# Patient Record
Sex: MALE | Race: WHITE | NOT HISPANIC OR LATINO | Employment: UNEMPLOYED | URBAN - METROPOLITAN AREA
[De-identification: names, ages, dates, MRNs, and addresses within clinical notes are randomized per-mention and may not be internally consistent; named-entity substitution may affect disease eponyms.]

---

## 2021-05-06 ENCOUNTER — OFFICE VISIT (OUTPATIENT)
Dept: NEUROLOGY | Facility: CLINIC | Age: 61
End: 2021-05-06

## 2021-05-06 VITALS
TEMPERATURE: 98.2 F | SYSTOLIC BLOOD PRESSURE: 130 MMHG | OXYGEN SATURATION: 98 % | DIASTOLIC BLOOD PRESSURE: 86 MMHG | WEIGHT: 224 LBS | BODY MASS INDEX: 31.36 KG/M2 | HEART RATE: 88 BPM | HEIGHT: 71 IN

## 2021-05-06 DIAGNOSIS — G43.C0 PERIODIC HEADACHE SYNDROME, NOT INTRACTABLE: Primary | ICD-10-CM

## 2021-05-06 PROCEDURE — 99244 OFF/OP CNSLTJ NEW/EST MOD 40: CPT | Performed by: PSYCHIATRY & NEUROLOGY

## 2021-05-06 RX ORDER — PANTOPRAZOLE SODIUM 40 MG/1
40 TABLET, DELAYED RELEASE ORAL 2 TIMES DAILY
COMMUNITY

## 2021-05-06 RX ORDER — FAMOTIDINE 20 MG/1
20 TABLET, FILM COATED ORAL DAILY PRN
COMMUNITY

## 2021-05-06 RX ORDER — OXYCODONE HYDROCHLORIDE AND ACETAMINOPHEN 5; 325 MG/1; MG/1
1 TABLET ORAL EVERY 6 HOURS PRN
COMMUNITY
End: 2021-05-06

## 2021-05-06 RX ORDER — FOLIC ACID 1 MG/1
1 TABLET ORAL DAILY
COMMUNITY

## 2021-05-06 RX ORDER — MAGNESIUM CHLORIDE 64 MG
TABLET, DELAYED RELEASE (ENTERIC COATED) ORAL DAILY
COMMUNITY

## 2021-05-06 RX ORDER — ELETRIPTAN HYDROBROMIDE 40 MG/1
40 TABLET, FILM COATED ORAL ONCE AS NEEDED
COMMUNITY

## 2021-05-06 RX ORDER — ROSUVASTATIN CALCIUM 40 MG/1
40 TABLET, COATED ORAL DAILY
COMMUNITY

## 2021-05-06 RX ORDER — TOPIRAMATE 25 MG/1
TABLET ORAL
Qty: 120 TABLET | Refills: 3 | Status: SHIPPED | OUTPATIENT
Start: 2021-05-06 | End: 2021-10-12 | Stop reason: DRUGHIGH

## 2021-05-06 RX ORDER — BUPROPION HYDROCHLORIDE 150 MG/1
150 TABLET, EXTENDED RELEASE ORAL 2 TIMES DAILY
COMMUNITY

## 2021-05-06 RX ORDER — MELATONIN
1000 DAILY
COMMUNITY

## 2021-05-06 RX ORDER — SUMATRIPTAN 100 MG/1
100 TABLET, FILM COATED ORAL
COMMUNITY
End: 2021-05-06 | Stop reason: ALTCHOICE
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
## 2021-08-24 ENCOUNTER — OFFICE VISIT (OUTPATIENT)
Dept: NEUROLOGY | Facility: CLINIC | Age: 61
End: 2021-08-24

## 2021-08-24 VITALS
BODY MASS INDEX: 30.32 KG/M2 | WEIGHT: 216.6 LBS | DIASTOLIC BLOOD PRESSURE: 82 MMHG | HEIGHT: 71 IN | HEART RATE: 70 BPM | OXYGEN SATURATION: 96 % | SYSTOLIC BLOOD PRESSURE: 118 MMHG

## 2021-08-24 DIAGNOSIS — R25.1 TREMOR: ICD-10-CM

## 2021-08-24 DIAGNOSIS — G43.C0 PERIODIC HEADACHE SYNDROME, NOT INTRACTABLE: Primary | ICD-10-CM

## 2021-08-24 DIAGNOSIS — R20.0 NUMBNESS AND TINGLING OF BOTH UPPER EXTREMITIES: ICD-10-CM

## 2021-08-24 DIAGNOSIS — R20.2 NUMBNESS AND TINGLING OF BOTH UPPER EXTREMITIES: ICD-10-CM

## 2021-08-24 PROCEDURE — 99213 OFFICE O/P EST LOW 20 MIN: CPT | Performed by: NURSE PRACTITIONER

## 2021-08-24 NOTE — PROGRESS NOTES
Headache New Office Visit      Encounter Date: 2021   Patient Name: Eliseo Hernandez  : 1960   MRN: [id number]     Chief Complaint:    Chief Complaint   Patient presents with   • Headache       History of Present Illness: Eliseo Hernandez is a 61 y.o. male who is here today for evaluation of headaches.      Last visit 21 w Dr Trent-start TPM, cont Relpax prn.    Taking Topamax with less frequency of severe HA. No new side effects. Has chronic N/T in fingertips bilat hands    Headache Symptoms:   Days per month:Less severe HA days, now only 3 severe HA/week. Still with mild HA daily.  Location: Forehead radiates to bilat sides and occipital area.  Quality: Pressure        Duration:1- 4-5 hours  Severity: 8-9/10   Triggers: None  Associated Symptoms: Photophobia, Phonophobia, Dizziness and  Vision changes none No whooshing sound  Aura: Prism rainbow can be in both eyes but not at the same time      Abortives: Relpax -occasionally effective. Sumatriptan  Preventives: BTX, Propranolol, TCAD, TPM          Numbness                                                                   Having numbness and tingling in arms and legs. Dropping fork and coffee cup. Has been present for years but worse recently. No history of CTS. Has Vit B12 def and taking replacement and folic acid. Numbness is not assoc w HA. Bilat UE tremor for years.        Subjective      Past Medical History:   Past Medical History:   Diagnosis Date   • Watt's esophagus    • Chronic back pain    • Depression    • GERD (gastroesophageal reflux disease)    • Hyperlipidemia    • Migraine headache    • Prediabetes    • Subclinical hypothyroidism    • Vitamin B12 deficiency    • Vitamin D deficiency        Past Surgical History:   Past Surgical History:   Procedure Laterality Date   • NO PAST SURGERIES         Family History:   Family History   Problem Relation Age of Onset   • Loi Parkinson White syndrome Son        Social History:    Social History     Socioeconomic History   • Marital status:      Spouse name: Not on file   • Number of children: Not on file   • Years of education: Not on file   • Highest education level: Not on file   Tobacco Use   • Smoking status: Former Smoker     Quit date: 2019     Years since quittin.0   • Smokeless tobacco: Never Used   Vaping Use   • Vaping Use: Never used   Substance and Sexual Activity   • Alcohol use: Not Currently   • Drug use: Never   • Sexual activity: Defer       Medications:     Current Outpatient Medications:   •  cholecalciferol (VITAMIN D3) 25 MCG (1000 UT) tablet, Take 1,000 Units by mouth Daily., Disp: , Rfl:   •  cyanocobalamin (VITAMIN B-12) 250 MCG tablet, Take 250 mcg by mouth Daily., Disp: , Rfl:   •  eletriptan (RELPAX) 40 MG tablet, Take 40 mg by mouth 1 (One) Time As Needed for Migraine. may repeat in 2 hours if necessary, Disp: , Rfl:   •  famotidine (PEPCID) 20 MG tablet, Take 20 mg by mouth 2 (Two) Times a Day., Disp: , Rfl:   •  folic acid (FOLVITE) 1 MG tablet, Take 1 mg by mouth Daily., Disp: , Rfl:   •  levothyroxine (SYNTHROID, LEVOTHROID) 50 MCG tablet, Take 50 mcg by mouth Daily., Disp: , Rfl:   •  magnesium chloride ER 64 MG DR tablet, Take  by mouth Daily., Disp: , Rfl:   •  pantoprazole (PROTONIX) 40 MG EC tablet, Take 40 mg by mouth Daily., Disp: , Rfl:   •  pyridoxine (VITMAIN B-6) 500 MG tablet, Take 500 mg by mouth Daily., Disp: , Rfl:   •  rosuvastatin (CRESTOR) 40 MG tablet, Take 40 mg by mouth Daily., Disp: , Rfl:   •  topiramate (Topamax) 25 MG tablet, Take one tablet twice a day for one week, then two tablets twice a day, Disp: 120 tablet, Rfl: 3  •  buPROPion SR (WELLBUTRIN SR) 150 MG 12 hr tablet, Take 150 mg by mouth 2 (Two) Times a Day., Disp: , Rfl:     Allergies:   No Known Allergies    PHQ-9 Total Score:     STEADI Fall Risk Assessment has not been completed.    Objective     Physical Exam:   Physical Exam  Eyes:      Pupils: Pupils are  "equal, round, and reactive to light.   Neurological:      Mental Status: He is oriented to person, place, and time.      Coordination: Finger-Nose-Finger Test, Heel to Shin Test and Romberg Test normal.      Gait: Gait is intact.   Psychiatric:         Speech: Speech normal.         Neurologic Exam     Mental Status   Oriented to person, place, and time.   Follows 3 step commands.   Attention: normal. Concentration: normal.   Speech: speech is normal   Level of consciousness: alert  Knowledge: consistent with education.   Normal comprehension.     Cranial Nerves     CN III, IV, VI   Pupils are equal, round, and reactive to light.  Right pupil: Accommodation: intact.   Left pupil: Accommodation: intact.   CN III: no CN III palsy  CN VI: no CN VI palsy  Nystagmus: none   Diplopia: none  Upgaze: normal  Downgaze: normal  Conjugate gaze: present    CN VII   Facial expression full, symmetric.     CN VIII   Hearing: intact    Motor Exam   Muscle bulk: normal  Overall muscle tone: normal    Strength   Right biceps: 5/5  Left biceps: 5/5  Right triceps: 5/5  Left triceps: 5/5  Right interossei: 5/5  Left interossei: 5/5  Left anterior tibial: 5/5  Left posterior tibial: 5/5    Sensory Exam   Light touch normal.     Gait, Coordination, and Reflexes     Gait  Gait: normal    Coordination   Romberg: negative  Finger to nose coordination: normal  Heel to shin coordination: normal    Tremor   Resting tremor: absent  Action tremor: left arm and right arm       Vital Signs:   Vitals:    08/24/21 1105   BP: 118/82   Pulse: 70   SpO2: 96%   Weight: 98.2 kg (216 lb 9.6 oz)   Height: 180.3 cm (70.98\")     Body mass index is 30.22 kg/m².         Assessment / Plan      Assessment/Plan:   Diagnoses and all orders for this visit:    1. Periodic headache syndrome, not intractable (Primary)  Comments:  Cont TPM and Relpax. Gave info on CGRPs. He [facility].  Orders:  -     MRI Brain With & Without Contrast; " Future    2. Tremor  -     MRI Brain With & Without Contrast; Future    3. Numbness and tingling of both upper extremities  -     MRI Cervical Spine With & Without Contrast; Future         Patient Education:     Reviewed medications, potential side effects and signs and symptoms to report. Discussed risk versus benefits of treatment plan with patient and/or family-including medications, labs and radiology that may be ordered. Addressed questions and concerns during visit. Patient and/or family verbalized understanding and agree with plan. Instructed to call the office with any questions and report to ER with any life-threatening symptoms.     Follow Up:   FU after MRI or sooner if he wants to start CGRP    During this visit the following were done:  Labs Reviewed []    Labs Ordered []    Radiology Reports Reviewed []    Radiology Ordered []    PCP Records Reviewed []    Referring Provider Records Reviewed []    ER Records Reviewed []    Hospital Records Reviewed []    History Obtained From Family []    Radiology Images Reviewed []    Other Reviewed [x]    Records Requested []      Patito Bryant, GRIFFIN, APRN

## 2021-09-20 ENCOUNTER — APPOINTMENT (OUTPATIENT)
Dept: MRI IMAGING | Facility: HOSPITAL | Age: 61
End: 2021-09-20

## 2021-09-28 ENCOUNTER — HOSPITAL ENCOUNTER (OUTPATIENT)
Dept: MRI IMAGING | Facility: HOSPITAL | Age: 61
Discharge: HOME | End: 2021-09-28

## 2021-09-28 DIAGNOSIS — R25.1 TREMOR: ICD-10-CM

## 2021-09-28 DIAGNOSIS — G43.C0 PERIODIC HEADACHE SYNDROME, NOT INTRACTABLE: ICD-10-CM

## 2021-09-28 DIAGNOSIS — R20.2 NUMBNESS AND TINGLING OF BOTH UPPER EXTREMITIES: ICD-10-CM

## 2021-09-28 DIAGNOSIS — R20.0 NUMBNESS AND TINGLING OF BOTH UPPER EXTREMITIES: ICD-10-CM

## 2021-09-28 PROCEDURE — A9577 INJ MULTIHANCE: HCPCS | Performed by: NURSE PRACTITIONER

## 2021-09-28 PROCEDURE — 72156 MRI NECK SPINE W/O & W/DYE: CPT

## 2021-09-28 PROCEDURE — 0 GADOBENATE DIMEGLUMINE 529 MG/ML SOLUTION: Performed by: NURSE PRACTITIONER

## 2021-09-28 PROCEDURE — 70553 MRI BRAIN STEM W/O & W/DYE: CPT

## 2021-09-28 RX ADMIN — GADOBENATE DIMEGLUMINE 20 ML: 529 INJECTION, SOLUTION INTRAVENOUS at 09:50

## 2021-09-30 ENCOUNTER — TELEPHONE (OUTPATIENT)
Dept: NEUROLOGY | Facility: CLINIC | Age: 61
End: 2021-09-30

## 2021-09-30 DIAGNOSIS — R93.7 ABNORMAL MRI, CERVICAL SPINE: ICD-10-CM

## 2021-09-30 DIAGNOSIS — R20.2 NUMBNESS AND TINGLING OF BOTH UPPER EXTREMITIES: Primary | ICD-10-CM

## 2021-09-30 DIAGNOSIS — R25.1 TREMOR: ICD-10-CM

## 2021-09-30 DIAGNOSIS — R20.0 NUMBNESS AND TINGLING OF BOTH UPPER EXTREMITIES: Primary | ICD-10-CM

## 2021-09-30 NOTE — TELEPHONE ENCOUNTER
Discussed MRI brain and c-spine with patient. [facility] [place] [facility]   MRI now with severe stenosis C4. Will refer to neurosurg for eval of UE paresthesia, weakness and abnormal MRI.

## 2021-10-06 ENCOUNTER — TELEPHONE (OUTPATIENT)
Dept: FAMILY MEDICINE CLINIC | Facility: CLINIC | Age: 61
End: 2021-10-06

## 2021-10-06 NOTE — TELEPHONE ENCOUNTER
Caller: Eliseo Hernandez    Relationship: Self    Best call back number: [phone]    What medications are you currently taking:   Current Outpatient Medications on File Prior to Visit   Medication Sig Dispense Refill   • buPROPion SR (WELLBUTRIN SR) 150 MG 12 hr tablet Take 150 mg by mouth 2 (Two) Times a Day.     • cholecalciferol (VITAMIN D3) 25 MCG (1000 UT) tablet Take 1,000 Units by mouth Daily.     • cyanocobalamin (VITAMIN B-12) 250 MCG tablet Take 250 mcg by mouth Daily.     • eletriptan (RELPAX) 40 MG tablet Take 40 mg by mouth 1 (One) Time As Needed for Migraine. may repeat in 2 hours if necessary     • famotidine (PEPCID) 20 MG tablet Take 20 mg by mouth 2 (Two) Times a Day.     • folic acid (FOLVITE) 1 MG tablet Take 1 mg by mouth Daily.     • levothyroxine (SYNTHROID, LEVOTHROID) 50 MCG tablet Take 50 mcg by mouth Daily.     • magnesium chloride ER 64 MG DR tablet Take  by mouth Daily.     • pantoprazole (PROTONIX) 40 MG EC tablet Take 40 mg by mouth Daily.     • pyridoxine (VITMAIN B-6) 500 MG tablet Take 500 mg by mouth Daily.     • rosuvastatin (CRESTOR) 40 MG tablet Take 40 mg by mouth Daily.     • topiramate (Topamax) 25 MG tablet Take one tablet twice a day for one week, then two tablets twice a day 120 tablet 3     No current facility-administered medications on file prior to visit.      PT HAS 10-12-21 APPT AND WAS TOLD TO LOOK OVER SOME RX OPTIONS AND LET ADRIAN HERNANDEZ KNOW WHICH OPTION PT WANTED TO TRY.    PT HAS DECIDED TO GO WITH [facility].

## 2021-10-12 ENCOUNTER — OFFICE VISIT (OUTPATIENT)
Dept: NEUROLOGY | Facility: CLINIC | Age: 61
End: 2021-10-12

## 2021-10-12 VITALS
DIASTOLIC BLOOD PRESSURE: 86 MMHG | WEIGHT: 223 LBS | HEIGHT: 71 IN | TEMPERATURE: 98 F | BODY MASS INDEX: 31.22 KG/M2 | SYSTOLIC BLOOD PRESSURE: 124 MMHG | HEART RATE: 93 BPM | OXYGEN SATURATION: 97 %

## 2021-10-12 DIAGNOSIS — M48.02 CERVICAL SPINAL STENOSIS: ICD-10-CM

## 2021-10-12 DIAGNOSIS — G43.C0 PERIODIC HEADACHE SYNDROME, NOT INTRACTABLE: Primary | ICD-10-CM

## 2021-10-12 DIAGNOSIS — G43.C0 PERIODIC HEADACHE SYNDROME, NOT INTRACTABLE: ICD-10-CM

## 2021-10-12 DIAGNOSIS — R20.2 NUMBNESS AND TINGLING OF BOTH UPPER EXTREMITIES: ICD-10-CM

## 2021-10-12 DIAGNOSIS — R20.0 NUMBNESS AND TINGLING OF BOTH UPPER EXTREMITIES: ICD-10-CM

## 2021-10-12 PROCEDURE — 99214 OFFICE O/P EST MOD 30 MIN: CPT | Performed by: NURSE PRACTITIONER

## 2021-10-12 RX ORDER — TOPIRAMATE 50 MG/1
TABLET, FILM COATED ORAL
Qty: 120 TABLET | Refills: 2 | Status: SHIPPED | OUTPATIENT
Start: 2021-10-12 | End: 2021-11-29

## 2021-10-12 RX ORDER — TOPIRAMATE 50 MG/1
TABLET, FILM COATED ORAL
Qty: 120 TABLET | Refills: 2 | Status: SHIPPED | OUTPATIENT
Start: 2021-10-12 | End: 2021-10-12 | Stop reason: SDUPTHER

## 2021-10-12 NOTE — TELEPHONE ENCOUNTER
E-Prescribing Status: Transmission to pharmacy failed (10/12/2021 11:43 AM EDT)    Jerald Temple,  We received a transmission failed message which usually means the script just needs to be sent in again, but before I do did you mean for the instructions to be take 1 tab BID for 1 week, then 1 tab BID or were you wanting him to titrate?

## 2021-10-12 NOTE — PROGRESS NOTES
Neuro Office Visit      Encounter Date: 10/12/2021   Patient Name: Eliseo Hernandez  : 1960   MRN: [id number]     Chief Complaint:    Chief Complaint   Patient presents with   • Headache       History of Present Illness: Eliseo Hernandez is a 61 y.o. male who is here today with his wife in Neurology for headaches.      Last appt 21 w me-Cont TPM, relpax, discuss CGRP, MRI brain and c-spine.    MRI Brain With & Without Contrast (2021 09:58)  FINDINGS: Brain: No acute infarct noted on diffusion weighted sequences.  Midline structures are unremarkable and the craniocervical junction is  satisfactory in appearance. Gray-white differentiation is maintained and  there is no evidence of intracranial hemorrhage, mass or mass effect.  The ventricles are normal in size and configuration. There is no  abnormal brain parenchymal enhancement. The orbits are unremarkable and  the paranasal sinuses are grossly clear. Intracranial arterial flow  voids are maintained.      SPRINGER  He did not increase the dose of Topamax, thus no change in HA symptoms. Using Relpax with some relief.   He has researched his formulary coverage and would like to try Nurtec. He is willing to go up on Topamax while we obtain PA on Nurtec.  Headaches continue with mild daily HA and 3 severe HA per week. Location is frontal radiating to bilat temporal and occipital area. Quality is pressure with 1-5 hour duration. Severe HA 8-9/10. No triggers. Assoc with photophobia, phonophobia and blurred vision. Denies whooshing sound. Aura is visual rainbow in unilateral eye.    Abortives:Relpax, Imitrex  Preventatitves:BTX, beta blocker, TCAD, TPM    Numbness  MRI Cervical Spine With & Without Contrast (2021 09:57)  IMPRESSION:  Prior C5-C7 two-level ACDF with well decompressed and patent  spinal canal and neural foramina at the operative levels. There is  adjacent level disease at C4-5 with disc osteophyte complex and  bilateral facet  arthropathy resulting in moderate to severe spinal canal  narrowing. There is severe right and moderate to severe left  neuroforaminal stenosis.    Continues to have bilat numbness in UE and lower ext. Dropping fork and coffee cup. Sx present for years. No history of CTS. Taking Vit B12 and folic acid for known def. Numbness not associated with HA.  Previous ACDF C5-7 in   Subjective      Past Medical History:   Past Medical History:   Diagnosis Date   • Watt's esophagus    • Chronic back pain    • Depression    • GERD (gastroesophageal reflux disease)    • Hyperlipidemia    • Migraine headache    • Prediabetes    • Subclinical hypothyroidism    • Vitamin B12 deficiency    • Vitamin D deficiency        Past Surgical History:   Past Surgical History:   Procedure Laterality Date   • CERVICAL DISCECTOMY ANTERIOR      C5-6       Family History:   Family History   Problem Relation Age of Onset   • Loi Parkinson White syndrome Son        Social History:   Social History     Socioeconomic History   • Marital status:    Tobacco Use   • Smoking status: Former Smoker     Quit date: 2019     Years since quittin.1   • Smokeless tobacco: Never Used   Vaping Use   • Vaping Use: Never used   Substance and Sexual Activity   • Alcohol use: Not Currently   • Drug use: Never   • Sexual activity: Defer       Medications:     Current Outpatient Medications:   •  buPROPion SR (WELLBUTRIN SR) 150 MG 12 hr tablet, Take 150 mg by mouth 2 (Two) Times a Day., Disp: , Rfl:   •  cholecalciferol (VITAMIN D3) 25 MCG (1000 UT) tablet, Take 1,000 Units by mouth Daily., Disp: , Rfl:   •  cyanocobalamin (VITAMIN B-12) 250 MCG tablet, Take 250 mcg by mouth Daily., Disp: , Rfl:   •  eletriptan (RELPAX) 40 MG tablet, Take 40 mg by mouth 1 (One) Time As Needed for Migraine. may repeat in 2 hours if necessary, Disp: , Rfl:   •  famotidine (PEPCID) 20 MG tablet, Take 20 mg by mouth 2 (Two) Times a Day., Disp: , Rfl:   •  folic acid  (FOLVITE) 1 MG tablet, Take 1 mg by mouth Daily., Disp: , Rfl:   •  levothyroxine (SYNTHROID, LEVOTHROID) 50 MCG tablet, Take 50 mcg by mouth Daily., Disp: , Rfl:   •  magnesium chloride ER 64 MG DR tablet, Take  by mouth Daily., Disp: , Rfl:   •  pantoprazole (PROTONIX) 40 MG EC tablet, Take 40 mg by mouth Daily., Disp: , Rfl:   •  pyridoxine (VITMAIN B-6) 500 MG tablet, Take 500 mg by mouth Daily., Disp: , Rfl:   •  rosuvastatin (CRESTOR) 40 MG tablet, Take 40 mg by mouth Daily., Disp: , Rfl:   •  topiramate (Topamax) 50 MG tablet, 1 bid x 1 week, then 1 bid, Disp: 120 tablet, Rfl: 2    Allergies:   No Known Allergies    PHQ-9 Total Score:     STEADI Fall Risk Assessment has not been completed.    Objective     Physical Exam:   Physical Exam  Eyes:      Pupils: Pupils are equal, round, and reactive to light.   Neurological:      Mental Status: He is oriented to person, place, and time.      Coordination: Finger-Nose-Finger Test, Heel to Shin Test and Romberg Test normal.      Gait: Gait is intact.      Deep Tendon Reflexes:      Reflex Scores:       Tricep reflexes are 2+ on the right side and 2+ on the left side.       Bicep reflexes are 2+ on the right side and 2+ on the left side.       Brachioradialis reflexes are 2+ on the right side and 2+ on the left side.       Patellar reflexes are 2+ on the right side and 2+ on the left side.       Achilles reflexes are 2+ on the right side and 2+ on the left side.  Psychiatric:         Speech: Speech normal.         Neurologic Exam     Mental Status   Oriented to person, place, and time.   Follows 3 step commands.   Attention: normal. Concentration: normal.   Speech: speech is normal   Level of consciousness: alert  Knowledge: consistent with education.   Normal comprehension.     Cranial Nerves     CN III, IV, VI   Pupils are equal, round, and reactive to light.  Right pupil: Accommodation: intact.   Left pupil: Accommodation: intact.   CN III: no CN III palsy  CN VI:  "no CN VI palsy  Nystagmus: none   Diplopia: none  Upgaze: normal  Downgaze: normal  Conjugate gaze: present    CN VII   Facial expression full, symmetric.     CN VIII   Hearing: intact    CN XII   CN XII normal.     Motor Exam   Muscle bulk: normal  Overall muscle tone: normal    Strength   Right biceps: 5/5  Left biceps: 5/5  Right triceps: 5/5  Left triceps: 5/5  Right interossei: 5/5  Left interossei: 5/5  Right quadriceps: 5/5  Left quadriceps: 5/5  Right anterior tibial: 5/5  Left anterior tibial: 5/5  Right posterior tibial: 5/5  Left posterior tibial: 5/5    Sensory Exam   Light touch normal.     Gait, Coordination, and Reflexes     Gait  Gait: normal    Coordination   Romberg: negative  Finger to nose coordination: normal  Heel to shin coordination: normal    Tremor   Resting tremor: absent  Action tremor: absent    Reflexes   Right brachioradialis: 2+  Left brachioradialis: 2+  Right biceps: 2+  Left biceps: 2+  Right triceps: 2+  Left triceps: 2+  Right patellar: 2+  Left patellar: 2+  Right achilles: 2+  Left achilles: 2+  Right : 2+  Left : 2+       Vital Signs:   Vitals:    10/12/21 1032   BP: 124/86   Pulse: 93   Temp: 98 °F (36.7 °C)   SpO2: 97%   Weight: 101 kg (223 lb)   Height: 180.3 cm (70.98\")     Body mass index is 31.12 kg/m².       Assessment / Plan      Assessment/Plan:   Diagnoses and all orders for this visit:    1. Periodic headache syndrome, not intractable (Primary)  Comments:  Increase TPM while waiting on [facility]  Orders:  -     topiramate (Topamax) 50 MG tablet; 1 bid x 1 week, then 1 bid  Dispense: 120 tablet; Refill: 2    2. Numbness and tingling of both upper extremities    3. Cervical spinal stenosis  Comments:  Has been referred to neurosurg. [facility]           Patient Education:     Reviewed medications, potential side effects and signs and symptoms to report. Discussed risk versus benefits of treatment plan with patient and/or family-including medications, " labs and radiology that may be ordered. Addressed questions and concerns during visit. Patient and/or family verbalized understanding and agree with plan. Instructed to call the office with any questions and report to ER with any life-threatening symptoms.     Follow Up:     After seen by Neurosurg.    During this visit the following were done:  Labs Reviewed []    Labs Ordered []    Radiology Reports Reviewed [x]    Radiology Ordered []    PCP Records Reviewed []    Referring Provider Records Reviewed []    ER Records Reviewed []    Hospital Records Reviewed []    History Obtained From Family []    Radiology Images Reviewed []    Other Reviewed []    Records Requested []      Patito Bryant, DNP, APRN

## 2021-10-19 ENCOUNTER — SPECIALTY PHARMACY (OUTPATIENT)
Dept: ONCOLOGY | Facility: HOSPITAL | Age: 61
End: 2021-10-19

## 2021-10-19 RX ORDER — RIMEGEPANT SULFATE 75 MG/75MG
75 TABLET, ORALLY DISINTEGRATING ORAL EVERY OTHER DAY
Qty: 16 TABLET | Refills: 11 | Status: SHIPPED | OUTPATIENT
Start: 2021-10-19 | End: 2021-11-08 | Stop reason: SDUPTHER

## 2021-10-19 NOTE — PROGRESS NOTES
[facility] for [facility]. Provided with my contact information and instructed to call if any questions/concerns should arise.

## 2021-11-08 ENCOUNTER — TELEPHONE (OUTPATIENT)
Dept: NEUROLOGY | Facility: CLINIC | Age: 61
End: 2021-11-08

## 2021-11-08 ENCOUNTER — SPECIALTY PHARMACY (OUTPATIENT)
Dept: ONCOLOGY | Facility: HOSPITAL | Age: 61
End: 2021-11-08

## 2021-11-08 RX ORDER — RIMEGEPANT SULFATE 75 MG/75MG
75 TABLET, ORALLY DISINTEGRATING ORAL EVERY OTHER DAY
Qty: 16 TABLET | Refills: 11 | Status: SHIPPED | OUTPATIENT
Start: 2021-11-08

## 2021-11-08 RX ORDER — RIMEGEPANT SULFATE 75 MG/75MG
75 TABLET, ORALLY DISINTEGRATING ORAL EVERY OTHER DAY
Qty: 16 TABLET | Refills: 11 | Status: CANCELLED | OUTPATIENT
Start: 2021-11-08

## 2021-11-08 NOTE — TELEPHONE ENCOUNTER
Provider: ROSALIND  Caller: PT  Relationship to Patient:   Pharmacy: N/A  Phone Number: [phone]  Reason for Call: RX FOR NURTEC MUST BE SENT TO [facility] TO BE FILLED; PLEASE CALL [facility] AT: [phone].    CALL PT WITH ANY QUESTIONS.    THANK YOU.

## 2021-11-29 ENCOUNTER — OFFICE VISIT (OUTPATIENT)
Dept: NEUROSURGERY | Facility: CLINIC | Age: 61
End: 2021-11-29

## 2021-11-29 VITALS
WEIGHT: 219.2 LBS | HEART RATE: 79 BPM | OXYGEN SATURATION: 100 % | BODY MASS INDEX: 30.69 KG/M2 | TEMPERATURE: 97.7 F | SYSTOLIC BLOOD PRESSURE: 120 MMHG | DIASTOLIC BLOOD PRESSURE: 90 MMHG | HEIGHT: 71 IN

## 2021-11-29 DIAGNOSIS — Z98.1 HX OF FUSION OF CERVICAL SPINE: ICD-10-CM

## 2021-11-29 DIAGNOSIS — M48.02 SPINAL STENOSIS IN CERVICAL REGION: Primary | ICD-10-CM

## 2021-11-29 PROCEDURE — 99204 OFFICE O/P NEW MOD 45 MIN: CPT | Performed by: NEUROLOGICAL SURGERY

## 2021-11-29 RX ORDER — AMOXICILLIN 500 MG/1
500 TABLET, FILM COATED ORAL 3 TIMES DAILY
COMMUNITY

## 2021-11-29 RX ORDER — LANOLIN ALCOHOL/MO/W.PET/CERES
CREAM (GRAM) TOPICAL
COMMUNITY
Start: 2021-10-24

## 2021-11-29 RX ORDER — TOPIRAMATE 100 MG/1
100 TABLET, FILM COATED ORAL 2 TIMES DAILY
COMMUNITY
Start: 2021-10-12

## 2021-11-29 NOTE — PROGRESS NOTES
NAME: MALCOLM GELLER   DOS: 2021  : 1960  PCP: Alfonso Hendrickson MD    Chief Complaint:    Chief Complaint   Patient presents with   • Neck & right shoulder pain   • Tremors   • Migraine       History of present illness  I saw    I saw this complex gentleman in neurosurgical consultation for the presence of some right shoulder issues.  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  He presents with a history of tremors, migraine, left-sided knee issues, numbness of his hands and dropping things.    He has had chronic neck pain for a number of years since his first surgery.  He is on chronic opioid therapy and [facility]    He was referred for the review of the MRI findings in the neck he is scheduled for a knee surgery by report    He has predominantly pain in his right shoulder he denies any clear-cut radicular symptoms he states when his pain is worse he has more tremors    PMHX  Allergies:  No Known Allergies  Medications    Current Outpatient Medications:   •  amoxicillin (AMOXIL) 500 MG tablet, Take 500 mg by mouth 3 (Three) Times a Day. Pt has 1 tablet left- 2021, Disp: , Rfl:   •  buPROPion SR (WELLBUTRIN SR) 150 MG 12 hr tablet, Take 150 mg by mouth 2 (Two) Times a Day., Disp: , Rfl:   •  cholecalciferol (VITAMIN D3) 25 MCG (1000 UT) tablet, Take 1,000 Units by mouth Daily., Disp: , Rfl:   •  cyanocobalamin (VITAMIN B-12) 250 MCG tablet, Take 250 mcg by mouth Daily., Disp: , Rfl:   •  eletriptan (RELPAX) 40 MG tablet, Take 40 mg by mouth 1 (One) Time As Needed for Migraine. may repeat in 2 hours if necessary, Disp: , Rfl:   •  famotidine (PEPCID) 20 MG tablet, Take 20 mg by mouth Daily As Needed., Disp: , Rfl:   •  folic acid (FOLVITE) 1 MG tablet, Take 1 mg by mouth Daily., Disp: , Rfl:   •  LEVOTHYROXINE SODIUM PO, Take 105 mcg by  mouth Daily., Disp: , Rfl:   •  magnesium chloride ER 64 MG DR tablet, Take  by mouth Daily., Disp: , Rfl:   •  pantoprazole (PROTONIX) 40 MG EC tablet, Take 40 mg by mouth 2 (Two) Times a Day., Disp: , Rfl:   •  Rimegepant Sulfate (Nurtec) 75 MG tablet dispersible tablet, Take 1 tablet by mouth Every Other Day., Disp: 16 tablet, Rfl: 11  •  rosuvastatin (CRESTOR) 40 MG tablet, Take 40 mg by mouth Daily., Disp: , Rfl:   •  topiramate (TOPAMAX) 100 MG tablet, Take 100 mg by mouth 2 (Two) Times a Day., Disp: , Rfl:   •  vitamin B-6 (PYRIDOXINE) 50 MG tablet, , Disp: , Rfl:   Past Medical History:  Past Medical History:   Diagnosis Date   • Back problem    • Watt's esophagus    • Chronic back pain    • Depression    • GERD (gastroesophageal reflux disease)    • HL (hearing loss)    • Hyperlipidemia    • Migraine headache    • Prediabetes    • Subclinical hypothyroidism    • Vitamin B12 deficiency    • Vitamin D deficiency      Past Surgical History:  Past Surgical History:   Procedure Laterality Date   • ANTERIOR CERVICAL DISCECTOMY W/ FUSION      [facility]- [place], [place]   • KNEE MENISCAL REPAIR Left 2017   • SHOULDER ARTHROSCOPY Left    • VASECTOMY       Social Hx:  Social History     Tobacco Use   • Smoking status: Former Smoker     Packs/day: 1.00     Years: 30.00     Pack years: 30.00     Types: Cigarettes     Quit date: 2019     Years since quittin.9   • Smokeless tobacco: Never Used   Vaping Use   • Vaping Use: Never used   Substance Use Topics   • Alcohol use: Not Currently   • Drug use: Never     Family Hx:  Family History   Problem Relation Age of Onset   • Loi Parkinson White syndrome Son    • Skin cancer Mother    • Heart disease Mother    • Hypertension Mother    • Hyperlipidemia Mother    • Stroke Mother    • Cancer Maternal Aunt         Spindle Cell Carcinoma   • Heart disease Maternal Grandmother    • Hypertension Maternal Grandmother    • Arthritis Maternal Grandmother    • Stroke  Maternal Grandfather    • Skin cancer Maternal Grandfather    • Heart disease Paternal Grandmother    • Breast cancer Paternal Grandmother    • Stroke Paternal Grandfather      Review of Systems:        Review of Systems   Constitutional: Positive for activity change, appetite change, chills and fatigue. Negative for diaphoresis, fever and unexpected weight change.   HENT: Positive for congestion, dental problem, hearing loss, sinus pressure, sneezing and tinnitus. Negative for drooling, ear discharge, ear pain, facial swelling, mouth sores, nosebleeds, postnasal drip, rhinorrhea, sinus pain, sore throat, trouble swallowing and voice change.    Eyes: Negative for photophobia, pain, discharge, redness, itching and visual disturbance.   Respiratory: Negative for apnea, cough, choking, chest tightness, shortness of breath, wheezing and stridor.    Cardiovascular: Negative for chest pain, palpitations and leg swelling.   Gastrointestinal: Positive for nausea. Negative for abdominal distention, abdominal pain, anal bleeding, blood in stool, constipation, diarrhea, rectal pain and vomiting.   Endocrine: Negative for cold intolerance, heat intolerance, polydipsia, polyphagia and polyuria.   Genitourinary: Negative for decreased urine volume, difficulty urinating, dysuria, enuresis, flank pain, frequency, genital sores, hematuria and urgency.   Musculoskeletal: Positive for back pain, neck pain and neck stiffness. Negative for arthralgias, gait problem, joint swelling and myalgias.   Skin: Negative for color change, pallor, rash and wound.   Allergic/Immunologic: Negative for environmental allergies, food allergies and immunocompromised state.   Neurological: Positive for dizziness, tremors, weakness, numbness and headaches. Negative for seizures, syncope, facial asymmetry, speech difficulty and light-headedness.   Hematological: Negative for adenopathy. Does not bruise/bleed easily.   Psychiatric/Behavioral: Positive for  agitation, confusion, decreased concentration and sleep disturbance. Negative for behavioral problems, dysphoric mood, hallucinations, self-injury and suicidal ideas. The patient is nervous/anxious. The patient is not hyperactive.    All other systems reviewed and are negative.       I have reviewed this note template and all pertinent parts of the review of systems social, family history, surgical history and medication list    Physical Examination:  Vitals:    11/29/21 1217   BP: 120/90   Pulse: 79   Temp: 97.7 °F (36.5 °C)   SpO2: 100%      General Appearance:   Well developed, well nourished, well groomed, alert, and cooperative.  Neurological examination:  Neurologic Exam  Vital signs were reviewed and documented in the chart  Patient appeared in good neurologic function with normal comprehension fluent speech  Mood and affect are normal  Sense of smell deferred    Covid mask left in place    Muscle bulk and tone normal  5 out of 5 strength no motor drift  Gait normal intact  Negative Romberg  No clonus long tract signs or myelopathy  Positive Tinel's at the wrist bilaterally    Fine tremor no cogwheeling  Reflexes symmetric  No edema noted and extremities skin appears normal  Decreased shoulder movement in the right side with some intrinsic shoulder dysfunction  Straight leg raise sign absent  No signs of intrinsic hip dysfunction  Back is without any lesions or abnormality  Feet are warm and well perfused        Review of Imaging/DATA:  I personally reviewed his MRI of the cervical spine the cervical spinal canal is widely patent and there is no evidence of cord signal change there is what I would classify as central mild stenosis.  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 Hernandez is a 61 y.o. male   This is a 61-year-old gentleman with a complex inner mangling of symptomatology that includes chronic headaches, tremor, superimposed  right-sided shoulder dysfunction as well as potentially a right C5 radiculopathy who is scheduled for upcoming left-sided knee surgery.    I spent quite a bit of time unpacking the logic behind work-up for this he has over a 10-year history of axial neck pain and I tried to attempt to explain what could and could not be fixed with a revision surgery.  I also explained that his right C4-5 disease given its adjacent level nature is quite a complex undertaking he has had a prior anterior cervical discectomy and fusion that surgery would require removal of the plate revision of C4-5 as well as reinstrumentation.  The work-up would include cervical myelogram CT scan his EMG nerve conduction study and he also probably has superimposed ulnar and carpal tunnel.  I do not necessarily think that the cervical reexploratory surgery would have anything consistently to offer as far as migraine reduction given his 20-year history.  I explained the elevated major elective nature of the surgery to he and his wife who are both a bit disappointed I think about the options.  On careful questioning it seems like his recent flareup of his right shoulder pain has actually indeed gotten better but it is more of a chronicity of his neck pain that may need to be addressed    My recommendations were ongoing monitoring    He clearly has no evidence of myelopathy I explained that the need to be sure that they are careful with a do his neck surgery but I would recommend having his knee operated on    Follow-up with interventional pain management    Follow-up with cervical flexion-extension films after his knee surgery and we can discuss further work-up with cervical myelogram and/or EMG nerve conduction studies

## 2021-11-29 NOTE — PATIENT INSTRUCTIONS
Dr. Moe's recommendations:  -Get your left knee operated on, and get recovered from that. If symptoms in your neck & shoulder don't get better we can get you back in to be seen with Dr. Moe's office.   -Continue to see Dr. Ortega to manage the migraines.   -Discuss with Psychologist in regard to coping skills/chronic pain.  -Recommend following up with your pain management doctor to discuss non surgical pain management.  -We will follow up with Dr. Bowers office in 5-6 months with XRAY.    Call our office if there is anything we can help you with.